# Patient Record
(demographics unavailable — no encounter records)

---

## 2025-01-03 NOTE — DISCUSSION/SUMMARY
[de-identified] : The patient was advised of the diagnosis. The natural history of the pathology was explained in full to the patient in layman's terms. Several different treatment options were discussed and explained in full to the patient including the risks and benefits of both surgical and non-surgical treatments. All questions and concerns were answered.    Lengthy discussion regarding options was had with the patient. Nonsurgical options including but not limited to cortisone,viscosupplementation, anti-inflammatory medications, activity modification,  non impact exercise /maintaining a healthy BMI, Weight loss program  bracing, and icing were reviewed. Surgical options including but not limited to arthroscopy, and joint replacement were discussed as was risks, benefits and alternatives. All questions were answered.   At this time the patient would like to proceed with repeat injection series. Patient reports significant relief from the previous injection series. Patient has increased pain, swelling and previous evidence of RIGHT KNEE OA on prior xray.  Patient tolerated procedure well, patient will follow up in 1 week for 2nd injection

## 2025-01-03 NOTE — PHYSICAL EXAM
[5___] : hamstring 5[unfilled]/5 [Right] : right knee [All Views] : anteroposterior, lateral, skyline, and anteroposterior standing [] : patella maltracking [de-identified] : Patella tracking laterally.  [FreeTextEntry9] : Medial joint space narrowing. Sclerosis of the medial knee. Varus deformity. Severe DJD medial compartment. Tricompartmental osteophyte formation. No fractures seen.     [TWNoteComboBox7] : flexion 100 degrees [de-identified] : extension 7 degrees

## 2025-01-03 NOTE — REASON FOR VISIT
[FreeTextEntry2] : Patient is follow up on RT KNEE PAIN, patient reports that he finished Gelsyn injection series on 5/31/24. Patient reports significant relief from these injections.

## 2025-01-03 NOTE — PROCEDURE
[Large Joint Injection] : Large joint injection [Right] : of the right [Knee] : knee [Pain] : pain [Inflammation] : inflammation [X-ray evidence of Osteoarthritis on this or prior visit] : x-ray evidence of Osteoarthritis on this or prior visit [Betadine] : betadine [Ethyl Chloride sprayed topically] : ethyl chloride sprayed topically [Gel-Syn (16.8mg)] : 16.8mg of Gel-Syn [Call if redness, pain or fever occur] : call if redness, pain or fever occur [Apply ice for 15min out of every hour for the next 12-24 hours as tolerated] : apply ice for 15 minutes out of every hour for the next 12-24 hours as tolerated [Patient had decreased mobility in the joint] : patient had decreased mobility in the joint [Risks, benefits, alternatives discussed / Verbal consent obtained] : the risks benefits, and alternatives have been discussed, and verbal consent was obtained [Sterile technique used] : sterile technique used [#1] : series #1

## 2025-01-10 NOTE — DISCUSSION/SUMMARY
[de-identified] : Patient tolerated procedure well, patient will follow up in 1 week for 3rd injection. Discussed importance of non-impact exercise and muscle stretching before and after exercise. Reviewed x-rays . Explained the importance of ice and rest.  Continue home strengthening and stretching program consisting of non-impact exercises and ice as needed.

## 2025-01-10 NOTE — PHYSICAL EXAM
[5___] : hamstring 5[unfilled]/5 [Right] : right knee [All Views] : anteroposterior, lateral, skyline, and anteroposterior standing [] : patella maltracking [de-identified] : Patella tracking laterally.  [FreeTextEntry9] : Medial joint space narrowing. Sclerosis of the medial knee. Varus deformity. Severe DJD medial compartment. Tricompartmental osteophyte formation. No fractures seen.     [TWNoteComboBox7] : flexion 100 degrees [de-identified] : extension 7 degrees

## 2025-01-10 NOTE — PROCEDURE
[Large Joint Injection] : Large joint injection [Right] : of the right [Knee] : knee [Pain] : pain [Inflammation] : inflammation [X-ray evidence of Osteoarthritis on this or prior visit] : x-ray evidence of Osteoarthritis on this or prior visit [Betadine] : betadine [Ethyl Chloride sprayed topically] : ethyl chloride sprayed topically [Sterile technique used] : sterile technique used [Gel-Syn (16.8mg)] : 16.8mg of Gel-Syn [#1] : series #1 [Call if redness, pain or fever occur] : call if redness, pain or fever occur [Apply ice for 15min out of every hour for the next 12-24 hours as tolerated] : apply ice for 15 minutes out of every hour for the next 12-24 hours as tolerated [Patient had decreased mobility in the joint] : patient had decreased mobility in the joint [Risks, benefits, alternatives discussed / Verbal consent obtained] : the risks benefits, and alternatives have been discussed, and verbal consent was obtained

## 2025-01-17 NOTE — DISCUSSION/SUMMARY
[de-identified] : The patient was advised of the diagnosis. The natural history of the pathology was explained in full to the patient in layman's terms. Several different treatment options were discussed and explained in full to the patient including the risks and benefits of both surgical and non-surgical treatments. All questions and concerns were answered.  Patient tolerated procedure well, patient will follow up in 6 months. Discussed importance of non-impact exercise and muscle stretching before and after exercise. Reviewed x-rays . Explained the importance of ice and rest.  Continue home strengthening and stretching program consisting of non-impact exercises and ice as needed.

## 2025-01-17 NOTE — PHYSICAL EXAM
[5___] : hamstring 5[unfilled]/5 [] : patient ambulates without assistive device [Right] : right knee [All Views] : anteroposterior, lateral, skyline, and anteroposterior standing [de-identified] : Patella tracking laterally.  [FreeTextEntry9] : Medial joint space narrowing. Sclerosis of the medial knee. Varus deformity. Severe DJD medial compartment. Tricompartmental osteophyte formation. No fractures seen.     [TWNoteComboBox7] : flexion 100 degrees [de-identified] : extension 7 degrees

## 2025-07-24 NOTE — HISTORY OF PRESENT ILLNESS
[de-identified] : 7/24/25: Patient presents today for follow up on the right knee pain. Patient completed Gelsyn series injection 1/17/2025 RT knee and reports significant relief. Patient reports that recently pain has been gradually returning with pain in the anterior and posterior knee, worse with prolonged sit to stand. Patient denies use of medication at this time.   after driving and sitting the knee is stiff and pain 7/10 stairs and inclines are difficult,  sleeping no problem

## 2025-07-24 NOTE — DISCUSSION/SUMMARY
[de-identified] : Diagnosis Knee Osteoarthritis   GLORIA HERNANDEZ 58 year  M was seen and evaluated in the office today. Following evaluation, and history of the patient's condition at length, the pathology was explained in full to the patient in layman's terms. Patient has Knee Osteoarthritis. Osteoarthritis is a degenerative joint disease where the cartilage in the knee gradually wears away, leading to pain, stiffness, and reduced mobility. Initially, symptoms may be mild, however this is a progressive condition, and the pain is expected to become more severe and persistent. Advanced stages of knee OA can result in significant disability, making daily activities challenging. I discussed with the patient that since this condition is expected to continue to worsen, they will eventually need a total knee replacement in their life time.   In the interim, discussed with patient several different treatment options regarding managing the gradual loss of function associated with knee OA, along with specific risks and benefits. Nonsurgical options including but not limited to Corticosteroid Injection, Visco Supplementation, Meloxicam 15mg, Medrol Dose Pack, along with activity modification such as non-impact exercise and organized physical therapy. The risk of morbidity associated with proposed treatments were discussed.   Furthermore, discussed with GLORIA that they could also delay any immediate treatment options and continue to observe and self-care for the discussed problem. Discussed Home Exercise Programs as well as Rest, Ice and elevation. Patient had ample time to ask any questions about todays visit and the diagnosis, and all questions were answered. Patient verbally expressed they understand the discussion today and the plan going forward. -- At this time, indicated patient for Meloxicam 15mg due to pain and inflammation of the knee.  -Patient was provided prescription for Meloxicam 15mg. There is a moderate risk of morbidity  from use of prescription strength medications. I recommended that the patient follow-up with their medical physician to discuss any significant specific potential issues with long term medication use such as interactions with current medications or with exacerbation of underlying medical comorbidities. The patient voiced their understanding of the risks including but not limited to bleeding, stroke, kidney dysfunction, heart disease. -Patient expressed that they have previously had GI distress with use of prescription NSAIDs, due to this will not proceed with prescription NSAIDs -Upon review of patient current medication use and confirmation with patient, patient currently taking blood thinner. Discussed that due to this prescription NSAIDs are contraindicated.   At this time, patient is indicated for physical therapy due to their reduced ROM and weakness. Discussed with patient the benefits of physical therapy due to their current pain and limited function. -Following discussion of the options the plan at this time is for the patient to go forward with organized therapy. Patient was provided with a Rx for PT at this time. Patient expressed understanding the treatment plan and will begin PT as soon as they can. They will f/u in 6-8 weeks for further evaluation.   -- Indicated for Physical Therapy and Meloxicam 15mg daily as needed   Both Rx 's Provided.  also indicated for Venous stasis changes bilateral lower extremitiess will apply for auth

## 2025-07-24 NOTE — PHYSICAL EXAM
[Right] : right knee [All Views] : anteroposterior, lateral, skyline, and anteroposterior standing [5___] : hamstring 5[unfilled]/5 [] : no tenderness [Left] : left knee [AP] : anteroposterior [Lateral] : lateral [Cornucopia] : skyline [Components well fixed, in good position] : Components well fixed, in good position [de-identified] : Patella tracking laterally.  [de-identified] : due to right  [FreeTextEntry9] : Medial joint space narrowing. Sclerosis of the medial knee. Varus deformity. Severe DJD medial compartment. Tricompartmental osteophyte formation. No fractures seen.  reviewed previous x ray     [TWNoteComboBox7] : flexion 120 degrees [de-identified] : extension 3 degrees